# Patient Record
Sex: FEMALE | Race: WHITE | NOT HISPANIC OR LATINO | Employment: STUDENT | URBAN - METROPOLITAN AREA
[De-identification: names, ages, dates, MRNs, and addresses within clinical notes are randomized per-mention and may not be internally consistent; named-entity substitution may affect disease eponyms.]

---
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
## 2022-11-04 ENCOUNTER — OFFICE VISIT (OUTPATIENT)
Dept: FAMILY MEDICINE CLINIC | Facility: CLINIC | Age: 22
End: 2022-11-04

## 2022-11-04 VITALS
OXYGEN SATURATION: 99 % | BODY MASS INDEX: 18.78 KG/M2 | WEIGHT: 110 LBS | HEART RATE: 116 BPM | HEIGHT: 64 IN | DIASTOLIC BLOOD PRESSURE: 68 MMHG | SYSTOLIC BLOOD PRESSURE: 102 MMHG

## 2022-11-04 DIAGNOSIS — F41.9 ANXIETY: ICD-10-CM

## 2022-11-04 DIAGNOSIS — R06.2 WHEEZE: ICD-10-CM

## 2022-11-04 DIAGNOSIS — J30.9 ALLERGIC RHINITIS, UNSPECIFIED SEASONALITY, UNSPECIFIED TRIGGER: Primary | ICD-10-CM

## 2022-11-04 DIAGNOSIS — J02.9 SORE THROAT: ICD-10-CM

## 2022-11-04 DIAGNOSIS — R05.3 CHRONIC COUGH: ICD-10-CM

## 2022-11-04 DIAGNOSIS — Z00.00 ENCOUNTER FOR MEDICAL EXAMINATION TO ESTABLISH CARE: ICD-10-CM

## 2022-11-04 LAB
EXPIRATION DATE: NORMAL
INTERNAL CONTROL: NORMAL
Lab: NORMAL
S PYO AG THROAT QL: NEGATIVE

## 2022-11-04 PROCEDURE — 99214 OFFICE O/P EST MOD 30 MIN: CPT | Performed by: STUDENT IN AN ORGANIZED HEALTH CARE EDUCATION/TRAINING PROGRAM

## 2022-11-04 PROCEDURE — 87880 STREP A ASSAY W/OPTIC: CPT | Performed by: STUDENT IN AN ORGANIZED HEALTH CARE EDUCATION/TRAINING PROGRAM

## 2022-11-04 RX ORDER — NORGESTIMATE AND ETHINYL ESTRADIOL 0.25-0.035
1 KIT ORAL DAILY
COMMUNITY
Start: 2022-10-22

## 2022-11-04 RX ORDER — MONTELUKAST SODIUM 10 MG/1
10 TABLET ORAL NIGHTLY
Qty: 30 TABLET | Refills: 2 | Status: SHIPPED | OUTPATIENT
Start: 2022-11-04 | End: 2022-12-21

## 2022-11-04 RX ORDER — ALBUTEROL SULFATE 90 UG/1
2 AEROSOL, METERED RESPIRATORY (INHALATION) EVERY 4 HOURS PRN
Qty: 8 G | Refills: 1 | Status: SHIPPED | OUTPATIENT
Start: 2022-11-04

## 2022-11-04 RX ORDER — FLUTICASONE PROPIONATE 50 MCG
SPRAY, SUSPENSION (ML) NASAL
COMMUNITY
Start: 2022-10-17

## 2022-11-04 NOTE — PROGRESS NOTES
New Patient Office Visit      Patient Name: Heidy Hector  : 2000   MRN: [id number]   Care Team: Patient Care Team:  Tarah Maier DO as PCP - General (Internal Medicine)    Chief Complaint:    Chief Complaint   Patient presents with   • new patient preventative medicine service   • Cough   • URI       History of Present Illness: Heidy Hector is a 22 y.o. female who is here today to establish care.  She is from [place]. She is a sophomore at , studying social work. She is typically healthy. Biggest concern today is allergies.     Follows with [facility]. She is taking OCP for contraception and menstrual regulation. Well controlled with this.     Allergic rhinitis - ongoing for the past 2 years. flonase helps only slightly. She has also been taking zyrtec without much relief. Has never seen allergist and is interested in this. Describes frequent throat clearing, nasal congestion, chronic coughing throughout the day and night, worse at night. Denies history asthma but does have occasional wheeze. Denies association with cold exposure or exercise. Denies history of food allergies. Denies sore throat typically but she has had one over the past few days and her little sister has strep. No fever.     Anxiety - taking Zoloft 50mg daily. Previously followed with talk therapy but not currently.     Subjective      Review of Systems:   Review of Systems - See HPI    Past Medical History: History reviewed. No pertinent past medical history.    Past Surgical History:   Past Surgical History:   Procedure Laterality Date   • CLEFT PALATE REPAIR     • HERNIA REPAIR         Family History: History reviewed. No pertinent family history.    Social History:   Social History     Socioeconomic History   • Marital status: Single   Tobacco Use   • Smoking status: Never   • Smokeless tobacco: Never   Vaping Use   • Vaping Use: Never used   Substance and Sexual Activity   • Alcohol use: Never   • Drug use:  "Never   • Sexual activity: Defer       Tobacco History:   Social History     Tobacco Use   Smoking Status Never   Smokeless Tobacco Never       Medications:     Current Outpatient Medications:   •  Estarylla 0.25-35 MG-MCG per tablet, Take 1 tablet by mouth Daily., Disp: , Rfl:   •  fluticasone (FLONASE) 50 MCG/ACT nasal spray, SHAKE LIQUID AND USE 1 SPRAY IN EACH NOSTRIL EVERY DAY, Disp: , Rfl:   •  sertraline (ZOLOFT) 50 MG tablet, Take 1 tablet by mouth Daily., Disp: , Rfl:   •  albuterol sulfate  (90 Base) MCG/ACT inhaler, Inhale 2 puffs Every 4 (Four) Hours As Needed for Wheezing or Shortness of Air., Disp: 8 g, Rfl: 1  •  montelukast (Singulair) 10 MG tablet, Take 1 tablet by mouth Every Night., Disp: 30 tablet, Rfl: 2    Allergies:   No Known Allergies    Objective     Physical Exam:  Vital Signs:   Vitals:    11/04/22 1006   BP: 102/68   Pulse: 116   SpO2: 99%   Weight: 49.9 kg (110 lb)   Height: 162.6 cm (64\")     Body mass index is 18.88 kg/m².     Physical Exam  Vitals reviewed.   Constitutional:       Appearance: Normal appearance. She is not ill-appearing.   HENT:      Head: Normocephalic and atraumatic.      Right Ear: Tympanic membrane normal.      Left Ear: Tympanic membrane normal.      Nose: Nose normal. No congestion.      Comments: No sinus tenderness     Mouth/Throat:      Pharynx: Posterior oropharyngeal erythema present. No oropharyngeal exudate.   Eyes:      Conjunctiva/sclera: Conjunctivae normal.   Cardiovascular:      Rate and Rhythm: Normal rate and regular rhythm.      Pulses: Normal pulses.      Heart sounds: Normal heart sounds. No murmur heard.  Pulmonary:      Effort: Pulmonary effort is normal. No respiratory distress.      Comments: Faint expiratory wheezes appreciated throughout all lung fields   Skin:     General: Skin is warm and dry.   Neurological:      Mental Status: She is alert.   Psychiatric:         Mood and Affect: Mood normal.         Behavior: Behavior normal.   "       Judgment: Judgment normal.         Assessment / Plan      Assessment/Plan:   Problems Addressed This Visit  Diagnoses and all orders for this visit:    1. Allergic rhinitis, unspecified seasonality, unspecified trigger (Primary)  -     Ambulatory Referral to Allergy  -     montelukast (Singulair) 10 MG tablet; Take 1 tablet by mouth Every Night.  Dispense: 30 tablet; Refill: 2    2. Wheeze  -     Ambulatory Referral to Allergy  -     Full Pulmonary Function Test With Bronchodilator; Future  -     montelukast (Singulair) 10 MG tablet; Take 1 tablet by mouth Every Night.  Dispense: 30 tablet; Refill: 2  -     albuterol sulfate  (90 Base) MCG/ACT inhaler; Inhale 2 puffs Every 4 (Four) Hours As Needed for Wheezing or Shortness of Air.  Dispense: 8 g; Refill: 1    3. Chronic cough  -     Full Pulmonary Function Test With Bronchodilator; Future  -     montelukast (Singulair) 10 MG tablet; Take 1 tablet by mouth Every Night.  Dispense: 30 tablet; Refill: 2  -     albuterol sulfate  (90 Base) MCG/ACT inhaler; Inhale 2 puffs Every 4 (Four) Hours As Needed for Wheezing or Shortness of Air.  Dispense: 8 g; Refill: 1    4. Anxiety    5. Encounter for medical examination to establish care    6. Sore throat  -     POCT rapid strep A      She has uncontrolled allergic rhinitis and concerns for new asthma diagnosis which may explain faint expiratory wheezing and chronic cough. Recommend referral to [facility] at [facility]. Will go ahead and order PFTs today. Recommend continuing flonase and antihistamine to help with rhinitis, will start trial of singulair daily. Discussed possibility of side effects with emphasis on mood, she expressed understanding and will let me know immediately if she feels medication is adversely impacting mood. We will also given trial of albuterol inhaler to be used prn.     Discussed importance of preventative care including vaccinations, age appropriate cancer  screening, routine lab work, healthy diet, and active lifestyle.    For anxiety - this is well controlled with zoloft at this time, will continue without changes. She politely declines referral to talk therapy.     Plan of care reviewed with patient at the conclusion of today's visit. Education was provided regarding diagnosis and management.  Patient verbalizes understanding of and agreement with management plan.      Follow Up:   Return in about 4 weeks (around 12/2/2022) for Recheck allergies and wheeze.          DO ELISSA Goodman RD  [facility]  [address]  [facility] [address]  Fax [phone]  Phone [phone]

## 2022-11-11 ENCOUNTER — TELEPHONE (OUTPATIENT)
Dept: FAMILY MEDICINE CLINIC | Facility: CLINIC | Age: 22
End: 2022-11-11

## 2022-11-11 NOTE — TELEPHONE ENCOUNTER
Caller: Lilly Hector    Relationship: Mother    Best call back number: [phone]    What was the call regarding: MOTHER CALLING ABOUT THE RECENT REFERRAL FOR ASTHMA TESTING. MOTHER STATES [facility] SAID THEY WILL DO 1 TEST AND OTHER PLACE SAID THEY COULD DO BOTH TESTS ON THE SAME DAY. MOTHER WANTS TO CLARIFY IT IS OKAY TO BOTH TESTS IN ONE DAY OR DIFFERENT DAYS.     Do you require a callback: YES

## 2022-11-11 NOTE — TELEPHONE ENCOUNTER
Patient has been contacted to schedule allergist appointment on 12/2, the practice offered to complete PFT and same day allergy testing.     Mother is unsure if the PFT should be scheduled internally at [facility] or if patient can have both completed with allergist.  Please advise

## 2022-12-21 ENCOUNTER — OFFICE VISIT (OUTPATIENT)
Dept: FAMILY MEDICINE CLINIC | Facility: CLINIC | Age: 22
End: 2022-12-21

## 2022-12-21 VITALS
OXYGEN SATURATION: 98 % | TEMPERATURE: 98.4 F | HEART RATE: 92 BPM | SYSTOLIC BLOOD PRESSURE: 108 MMHG | HEIGHT: 64 IN | WEIGHT: 112.6 LBS | DIASTOLIC BLOOD PRESSURE: 72 MMHG | BODY MASS INDEX: 19.22 KG/M2

## 2022-12-21 DIAGNOSIS — Z23 IMMUNIZATION DUE: ICD-10-CM

## 2022-12-21 DIAGNOSIS — J30.9 ALLERGIC RHINITIS, UNSPECIFIED SEASONALITY, UNSPECIFIED TRIGGER: Primary | ICD-10-CM

## 2022-12-21 DIAGNOSIS — R05.3 CHRONIC COUGH: ICD-10-CM

## 2022-12-21 PROCEDURE — 99213 OFFICE O/P EST LOW 20 MIN: CPT | Performed by: STUDENT IN AN ORGANIZED HEALTH CARE EDUCATION/TRAINING PROGRAM

## 2022-12-21 PROCEDURE — 90471 IMMUNIZATION ADMIN: CPT | Performed by: STUDENT IN AN ORGANIZED HEALTH CARE EDUCATION/TRAINING PROGRAM

## 2022-12-21 PROCEDURE — 90686 IIV4 VACC NO PRSV 0.5 ML IM: CPT | Performed by: STUDENT IN AN ORGANIZED HEALTH CARE EDUCATION/TRAINING PROGRAM

## 2022-12-21 RX ORDER — BUDESONIDE AND FORMOTEROL FUMARATE DIHYDRATE 80; 4.5 UG/1; UG/1
AEROSOL RESPIRATORY (INHALATION)
COMMUNITY
Start: 2022-12-12

## 2022-12-21 NOTE — PROGRESS NOTES
Established Office Visit      Patient Name: Heidy Hector  : 2000   MRN: [id number]   Care Team: Patient Care Team:  Tarah Maier DO as PCP - General (Internal Medicine)    Chief Complaint:    Chief Complaint   Patient presents with   • Follow-up       History of Present Illness: Heidy Hector is a 22 y.o. female who is here today to follow up with allergy/asthma.    As detailed in last note, she has uncontrolled allergic rhinitis and concerns for new asthma diagnosis which may explain faint expiratory wheezing and chronic cough. She was referred to Dr. Murphy at [facility] for consultation and PFTs. Her PFTs were inconsistent with asthma. She was started on trial of symbicort and has not tried this yet.     She has been taking flonase, antihistamine and we started trial of singulair daily, albuterol prn last visit. She has been using the albuterol with some improvement. Has not started Singulair due to fear of side effects.     Coughing has significantly improved. Congestion and wheezing has improved as well. She feels avoiding her known triggers has been helpful after completing allergy testing. Dust is a large trigger.       Subjective      Review of Systems:   Review of Systems - See HPI    I have reviewed and the following portions of the patient's history were updated as appropriate: past family history, past medical history, past social history, past surgical history and problem list.    Medications:     Current Outpatient Medications:   •  albuterol sulfate  (90 Base) MCG/ACT inhaler, Inhale 2 puffs Every 4 (Four) Hours As Needed for Wheezing or Shortness of Air., Disp: 8 g, Rfl: 1  •  budesonide-formoterol (SYMBICORT) 80-4.5 MCG/ACT inhaler, INHALE 2 PUFF BY MOUTH EVERY 12 HOURS WITH SPACER. RINSE MOUTH AFTER EACH USE, Disp: , Rfl:   •  Estarylla 0.25-35 MG-MCG per tablet, Take 1 tablet by mouth Daily., Disp: , Rfl:   •  fluticasone (FLONASE) 50 MCG/ACT nasal  "spray, SHAKE LIQUID AND USE 1 SPRAY IN EACH NOSTRIL EVERY DAY, Disp: , Rfl:   •  sertraline (ZOLOFT) 50 MG tablet, Take 1 tablet by mouth Daily., Disp: , Rfl:     Allergies:   No Known Allergies    Objective     Physical Exam:  Vital Signs:   Vitals:    12/21/22 1258   BP: 108/72   BP Location: Left arm   Patient Position: Sitting   Cuff Size: Adult   Pulse: 92   Temp: 98.4 °F (36.9 °C)   TempSrc: Temporal   SpO2: 98%   Weight: 51.1 kg (112 lb 9.6 oz)   Height: 162.6 cm (64.02\")   PainSc: 0-No pain     Body mass index is 19.32 kg/m².     Physical Exam  Vitals reviewed.   Constitutional:       General: She is not in acute distress.     Appearance: Normal appearance. She is not ill-appearing.   HENT:      Nose: No congestion or rhinorrhea.   Cardiovascular:      Rate and Rhythm: Normal rate.      Pulses: Normal pulses.   Pulmonary:      Effort: Pulmonary effort is normal. No respiratory distress.      Breath sounds: Normal breath sounds. No wheezing or rhonchi.   Skin:     General: Skin is warm and dry.   Neurological:      Mental Status: She is alert.   Psychiatric:         Mood and Affect: Mood normal.         Behavior: Behavior normal.         Judgment: Judgment normal.         Assessment / Plan      Assessment/Plan:   Problems Addressed This Visit  Diagnoses and all orders for this visit:    1. Allergic rhinitis, unspecified seasonality, unspecified trigger (Primary)    2. Chronic cough    3. Immunization due  -     FluLaval/Fluzone >6 mos ([id number])      Improvement in allergic rhinitis and chronic cough with current regimen - flonase, antihistamine, albuterol prn and avoiding known triggers. Identifying her allergies has been helpful as she has been able to avoid more triggers and this has made a significant difference. She is still following with family allergy/asthma and is considering injection therapy for allergies if symptoms are not well controlled in the long run. At this time, she is happy with her " symptom control. She is eager to try symbicort as advised by her allergist as well.     Plan of care reviewed with patient at the conclusion of today's visit. Education was provided regarding diagnosis and management.  Patient verbalizes understanding of and agreement with management plan.    Follow Up:   Return in about 6 months (around 6/21/2023) for Annual.        DO ELISSA Goodman RD  [facility]  [address]  [facility] [address]  Fax [phone]  Phone [phone]

## 2022-12-30 NOTE — TELEPHONE ENCOUNTER
Caller: Heidy Hector    Relationship: Self    Best call back number: [phone]    PATIENT IS CALLING IN TO CHECK ON THE STATUS OF THIS REQUEST.

## 2022-12-30 NOTE — TELEPHONE ENCOUNTER
Rx Refill Note  Requested Prescriptions     Pending Prescriptions Disp Refills   • sertraline (ZOLOFT) 50 MG tablet       Sig: Take 1 tablet by mouth Daily.      Last office visit with prescribing clinician: 12/21/2022   Last telemedicine visit with prescribing clinician: Visit date not found   Next office visit with prescribing clinician: Visit date not found                         Would you like a call back once the refill request has been completed: [] Yes [] No    If the office needs to give you a call back, can they leave a voicemail: [] Yes [] No    Sandra Haines MA  12/30/22, 13:15 EST

## 2023-01-30 NOTE — TELEPHONE ENCOUNTER
Rx Refill Note  Requested Prescriptions     Pending Prescriptions Disp Refills   • sertraline (ZOLOFT) 50 MG tablet 90 tablet 0     Sig: Take 1 tablet by mouth Daily.      Last office visit with prescribing clinician: 12/21/2022  Last telemedicine visit with prescribing clinician: Visit date not found   Next office visit with prescribing clinician: Visit date not found                         Would you like a call back once the refill request has been completed: [] Yes [] No    If the office needs to give you a call back, can they leave a voicemail: [] Yes [] No    Thelma Winkler MA  01/30/23, 10:52 EST

## 2023-02-09 DIAGNOSIS — J30.9 ALLERGIC RHINITIS, UNSPECIFIED SEASONALITY, UNSPECIFIED TRIGGER: ICD-10-CM

## 2023-02-09 DIAGNOSIS — R06.2 WHEEZE: ICD-10-CM

## 2023-02-09 DIAGNOSIS — R05.3 CHRONIC COUGH: ICD-10-CM

## 2023-02-09 NOTE — TELEPHONE ENCOUNTER
Rx Refill Note  Requested Prescriptions     Pending Prescriptions Disp Refills   • montelukast (SINGULAIR) 10 MG tablet [Pharmacy Med Name: MONTELUKAST 10MG TABLETS] 30 tablet 2     Sig: TAKE 1 TABLET BY MOUTH EVERY NIGHT      Last office visit with prescribing clinician: 12/21/2022   Last telemedicine visit with prescribing clinician: Visit date not found   Next office visit with prescribing clinician: Visit date not found                         Would you like a call back once the refill request has been completed: [] Yes [] No    If the office needs to give you a call back, can they leave a voicemail: [] Yes [] No    Sandra Haines MA  02/09/23, 11:39 EST           Attempted to contact patient no answer left vm.      The original prescription was discontinued on 12/21/2022 by Tarah Maier DO. Renewing this prescription may not be appropriate.            HUB MAY RELAY MESSAGE AND DOCUMENT

## 2023-02-10 RX ORDER — MONTELUKAST SODIUM 10 MG/1
10 TABLET ORAL NIGHTLY
Qty: 30 TABLET | Refills: 2 | OUTPATIENT
Start: 2023-02-10

## 2023-02-10 NOTE — TELEPHONE ENCOUNTER
Rx Refill Note  Requested Prescriptions     Pending Prescriptions Disp Refills   • montelukast (SINGULAIR) 10 MG tablet [Pharmacy Med Name: MONTELUKAST 10MG TABLETS] 30 tablet 2     Sig: TAKE 1 TABLET BY MOUTH EVERY NIGHT      Last office visit with prescribing clinician: 12/21/2022   Last telemedicine visit with prescribing clinician: Visit date not found   Next office visit with prescribing clinician: Visit date not found                         Would you like a call back once the refill request has been completed: [] Yes [] No    If the office needs to give you a call back, can they leave a voicemail: [] Yes [] No    Nickie Brizuela MA  02/10/23, 08:26 EST

## 2023-07-24 ENCOUNTER — OUTSIDE FACILITY SERVICE (OUTPATIENT)
Dept: GASTROENTEROLOGY | Facility: CLINIC | Age: 23
End: 2023-07-24
Payer: COMMERCIAL

## 2023-07-24 ENCOUNTER — LAB REQUISITION (OUTPATIENT)
Dept: LAB | Facility: HOSPITAL | Age: 23
End: 2023-07-24
Payer: COMMERCIAL

## 2023-07-24 DIAGNOSIS — Z12.11 ENCOUNTER FOR SCREENING FOR MALIGNANT NEOPLASM OF COLON: ICD-10-CM

## 2023-07-24 DIAGNOSIS — K64.8 OTHER HEMORRHOIDS: ICD-10-CM

## 2023-07-24 DIAGNOSIS — R19.7 DIARRHEA, UNSPECIFIED: ICD-10-CM

## 2023-07-24 PROCEDURE — 45380 COLONOSCOPY AND BIOPSY: CPT | Performed by: INTERNAL MEDICINE

## 2023-07-24 PROCEDURE — 88305 TISSUE EXAM BY PATHOLOGIST: CPT

## 2023-07-25 LAB — REF LAB TEST METHOD: NORMAL

## 2023-08-31 ENCOUNTER — PATIENT MESSAGE (OUTPATIENT)
Dept: FAMILY MEDICINE CLINIC | Facility: CLINIC | Age: 23
End: 2023-08-31
Payer: COMMERCIAL

## 2023-08-31 DIAGNOSIS — F41.9 ANXIETY: Primary | ICD-10-CM

## 2023-08-31 RX ORDER — SERTRALINE HYDROCHLORIDE 100 MG/1
100 TABLET, FILM COATED ORAL DAILY
Qty: 90 TABLET | Refills: 1 | Status: SHIPPED | OUTPATIENT
Start: 2023-08-31

## 2024-01-09 ENCOUNTER — TELEPHONE (OUTPATIENT)
Dept: FAMILY MEDICINE CLINIC | Facility: CLINIC | Age: 24
End: 2024-01-09
Payer: COMMERCIAL

## 2024-01-09 NOTE — TELEPHONE ENCOUNTER
Caller: Lilly Hector    Relationship to patient: Mother    Best call back number: [phone]     Chief complaint: HIVES, ITCHING, RED, LIGHTHEADED     Patient directed to call 911 or go to their nearest emergency room.     Patient verbalized understanding: [x] Yes  [] No  If no, why?    Additional notes:TAKING PATIENT TO EMERGENCY ROOM

## 2024-03-11 DIAGNOSIS — F41.9 ANXIETY: ICD-10-CM

## 2024-03-11 RX ORDER — SERTRALINE HYDROCHLORIDE 100 MG/1
100 TABLET, FILM COATED ORAL DAILY
Qty: 90 TABLET | Refills: 1 | Status: SHIPPED | OUTPATIENT
Start: 2024-03-11

## 2024-12-30 DIAGNOSIS — F41.9 ANXIETY: ICD-10-CM

## 2024-12-31 RX ORDER — SERTRALINE HYDROCHLORIDE 100 MG/1
100 TABLET, FILM COATED ORAL DAILY
Qty: 90 TABLET | Refills: 0 | Status: SHIPPED | OUTPATIENT
Start: 2024-12-31

## 2025-01-09 ENCOUNTER — OFFICE VISIT (OUTPATIENT)
Dept: FAMILY MEDICINE CLINIC | Facility: CLINIC | Age: 25
End: 2025-01-09
Payer: COMMERCIAL

## 2025-01-09 VITALS
SYSTOLIC BLOOD PRESSURE: 110 MMHG | BODY MASS INDEX: 20.73 KG/M2 | OXYGEN SATURATION: 98 % | DIASTOLIC BLOOD PRESSURE: 70 MMHG | HEART RATE: 74 BPM | HEIGHT: 64 IN | WEIGHT: 121.4 LBS

## 2025-01-09 DIAGNOSIS — Z23 NEED FOR VACCINATION: ICD-10-CM

## 2025-01-09 DIAGNOSIS — Z91.018 H/O FOOD ALLERGY: ICD-10-CM

## 2025-01-09 DIAGNOSIS — F41.9 ANXIETY: Primary | ICD-10-CM

## 2025-01-09 PROCEDURE — 90471 IMMUNIZATION ADMIN: CPT

## 2025-01-09 PROCEDURE — 99214 OFFICE O/P EST MOD 30 MIN: CPT

## 2025-01-09 PROCEDURE — 90656 IIV3 VACC NO PRSV 0.5 ML IM: CPT

## 2025-01-09 RX ORDER — SERTRALINE HYDROCHLORIDE 100 MG/1
100 TABLET, FILM COATED ORAL DAILY
Qty: 90 TABLET | Refills: 3 | Status: SHIPPED | OUTPATIENT
Start: 2025-01-09

## 2025-01-09 NOTE — ASSESSMENT & PLAN NOTE
- Stable on sertraline, refills provided today  - Discussed common side effects of SSRI use  - Advised patient to return to clinic before refills due, and to discuss taper plan if/when she decides she would like to reduce dose.   Orders:    sertraline (ZOLOFT) 100 MG tablet; Take 1 tablet by mouth Daily.

## 2025-01-09 NOTE — PROGRESS NOTES
Office Note     Name: Heidy Hector    : 2000     MRN: [id number]     Chief Complaint  Anxiety (Pt. States she needs Medication refill)    Subjective     History of Present Illness:  Heidy Hector is a 24 y.o. female who presents today for medication refill.     She states she has been on zoloft for approximately 2 years and has been very pleased with the results. She states at some point, she would like to consider lowering the dose, but does not want to currently. Endorses good tolerance and adherence to this medication. Denies SI/HI.     Also on OCP, which is managed and filled by OB/GYN provider.     She also has a question about allergy testing. She states she had a severe allergic reaction which resulted in an ED visit last year, and she suspected it was due to a new protein bar she had eaten. She states she had a similar but much more mild reaction recently to some soup dumplings. The only common ingredient she could find was soy, but she states she eats soy on other occasions without issue. She wonders if she needs to return to allergist.     Review of Systems:   Review of Systems   Constitutional:  Negative for chills, fatigue and fever.   HENT:  Negative for congestion, ear pain, rhinorrhea, sinus pain and sore throat.    Respiratory:  Negative for cough, shortness of breath and wheezing.    Cardiovascular:  Negative for chest pain.   Gastrointestinal:  Negative for abdominal pain, constipation, diarrhea, nausea and vomiting.   Musculoskeletal:  Negative for myalgias.   Neurological:  Negative for dizziness and headaches.        Past Medical History: History reviewed. No pertinent past medical history.    Past Surgical History:   Past Surgical History:   Procedure Laterality Date    CLEFT PALATE REPAIR      HERNIA REPAIR         Family History: History reviewed. No pertinent family history.    Social History:   Social History     Socioeconomic History    Marital status: Single   Tobacco  "Use    Smoking status: Never     Passive exposure: Never    Smokeless tobacco: Never   Vaping Use    Vaping status: Never Used   Substance and Sexual Activity    Alcohol use: Never    Drug use: Never    Sexual activity: Defer       Immunizations:   Immunization History   Administered Date(s) Administered    COVID-19 (PFIZER) Purple Cap Monovalent 06/01/2021, 07/12/2021    Fluzone (or Fluarix & Flulaval for VFC) >6mos 12/21/2022        Medications:     Current Outpatient Medications:     Estarylla 0.25-35 MG-MCG per tablet, Take 1 tablet by mouth Daily., Disp: , Rfl:     Hydrocortisone, Perianal, (ANUSOL-HC) 2.5 % rectal cream, Insert  into the rectum 2 (Two) Times a Day., Disp: 28 g, Rfl: 0    ondansetron ODT (ZOFRAN-ODT) 4 MG disintegrating tablet, Place 1 tablet on the tongue Every 8 (Eight) Hours As Needed for Nausea or Vomiting., Disp: 30 tablet, Rfl: 0    sertraline (ZOLOFT) 100 MG tablet, TAKE 1 TABLET BY MOUTH DAILY, Disp: 90 tablet, Rfl: 0    Sod Picosulfate-Mag Ox-Cit Acd (Clenpiq) 10-3.5-12 MG-GM -GM/160ML solution, Take 350 mL by mouth Take As Directed., Disp: 350 mL, Rfl: 0    Allergies:   No Known Allergies    Objective     Vital Signs  /70   Pulse 74   Ht 162.6 cm (64.02\")   Wt 55.1 kg (121 lb 6.4 oz)   SpO2 98%   BMI 20.83 kg/m²   Estimated body mass index is 20.83 kg/m² as calculated from the following:    Height as of this encounter: 162.6 cm (64.02\").    Weight as of this encounter: 55.1 kg (121 lb 6.4 oz).    BMI is within normal parameters. No other follow-up for BMI required.     PHQ-2 Depression Screening  Little interest or pleasure in doing things? Not at all   Feeling down, depressed, or hopeless? Not at all   PHQ-2 Total Score 0       Physical Exam  Vitals reviewed.   Constitutional:       General: She is not in acute distress.     Appearance: Normal appearance. She is not ill-appearing.   HENT:      Head: Normocephalic and atraumatic.      Nose: Nose normal. No congestion.      " Mouth/Throat:      Mouth: Mucous membranes are moist. Mucous membranes are dry.      Pharynx: No oropharyngeal exudate or posterior oropharyngeal erythema.   Eyes:      Pupils: Pupils are equal, round, and reactive to light.   Cardiovascular:      Rate and Rhythm: Normal rate and regular rhythm.      Pulses: Normal pulses.      Heart sounds: Normal heart sounds. No murmur heard.  Pulmonary:      Effort: Pulmonary effort is normal. No respiratory distress.      Breath sounds: Normal breath sounds. No wheezing or rales.   Lymphadenopathy:      Cervical: No cervical adenopathy.   Neurological:      Mental Status: She is alert.          Procedures     Results:  No results found for this or any previous visit (from the past 24 hours).     Assessment and Plan     Assessment/Plan:  Assessment & Plan  Anxiety  - Stable on sertraline, refills provided today  - Discussed common side effects of SSRI use  - Advised patient to return to clinic before refills due, and to discuss taper plan if/when she decides she would like to reduce dose.   Orders:    sertraline (ZOLOFT) 100 MG tablet; Take 1 tablet by mouth Daily.    Need for vaccination    Orders:    Fluzone >6mos ([id number])    H/O food allergy  Discussed the difference between food allergies vs intolerances. Also advised that patient may explore additives/preservatives between offending foods, which may cause reactions. Advised patient to reduce intake of processed foods in general for improved health but also to consider keeping allergy log/diary. Informed patient that elimination diet may help her to explore her own intolerances.            Follow Up: JR Shelby PA-C   [facility]